# Patient Record
Sex: MALE
[De-identification: names, ages, dates, MRNs, and addresses within clinical notes are randomized per-mention and may not be internally consistent; named-entity substitution may affect disease eponyms.]

---

## 2022-04-14 ENCOUNTER — APPOINTMENT (OUTPATIENT)
Dept: PULMONOLOGY | Facility: CLINIC | Age: 67
End: 2022-04-14
Payer: MEDICARE

## 2022-04-14 VITALS
TEMPERATURE: 98.4 F | WEIGHT: 163 LBS | HEART RATE: 67 BPM | HEIGHT: 65 IN | RESPIRATION RATE: 13 BRPM | DIASTOLIC BLOOD PRESSURE: 88 MMHG | SYSTOLIC BLOOD PRESSURE: 150 MMHG | BODY MASS INDEX: 27.16 KG/M2 | OXYGEN SATURATION: 95 %

## 2022-04-14 DIAGNOSIS — E78.5 HYPERLIPIDEMIA, UNSPECIFIED: ICD-10-CM

## 2022-04-14 DIAGNOSIS — Z86.73 PERSONAL HISTORY OF TRANSIENT ISCHEMIC ATTACK (TIA), AND CEREBRAL INFARCTION W/OUT RESIDUAL DEFICITS: ICD-10-CM

## 2022-04-14 DIAGNOSIS — Z86.39 PERSONAL HISTORY OF OTHER ENDOCRINE, NUTRITIONAL AND METABOLIC DISEASE: ICD-10-CM

## 2022-04-14 DIAGNOSIS — Z87.438 PERSONAL HISTORY OF OTHER DISEASES OF MALE GENITAL ORGANS: ICD-10-CM

## 2022-04-14 DIAGNOSIS — R06.02 SHORTNESS OF BREATH: ICD-10-CM

## 2022-04-14 DIAGNOSIS — G81.91 HEMIPLEGIA, UNSPECIFIED AFFECTING RIGHT DOMINANT SIDE: ICD-10-CM

## 2022-04-14 PROBLEM — Z00.00 ENCOUNTER FOR PREVENTIVE HEALTH EXAMINATION: Status: ACTIVE | Noted: 2022-04-14

## 2022-04-14 PROCEDURE — 99205 OFFICE O/P NEW HI 60 MIN: CPT | Mod: 25

## 2022-04-14 PROCEDURE — 94729 DIFFUSING CAPACITY: CPT

## 2022-04-14 PROCEDURE — 94727 GAS DIL/WSHOT DETER LNG VOL: CPT

## 2022-04-14 PROCEDURE — 94060 EVALUATION OF WHEEZING: CPT

## 2022-04-14 RX ORDER — OMEPRAZOLE 40 MG/1
CAPSULE, DELAYED RELEASE ORAL
Refills: 0 | Status: ACTIVE | COMMUNITY

## 2022-04-14 RX ORDER — AMLODIPINE BESYLATE 5 MG/1
TABLET ORAL
Refills: 0 | Status: ACTIVE | COMMUNITY

## 2022-04-14 RX ORDER — EMPAGLIFLOZIN 25 MG/1
25 TABLET, FILM COATED ORAL
Refills: 0 | Status: ACTIVE | COMMUNITY

## 2022-04-14 RX ORDER — FINASTERIDE 5 MG/1
5 TABLET, FILM COATED ORAL
Refills: 0 | Status: ACTIVE | COMMUNITY

## 2022-04-14 RX ORDER — CLOPIDOGREL BISULFATE 75 MG/1
75 TABLET, FILM COATED ORAL
Refills: 0 | Status: ACTIVE | COMMUNITY

## 2022-04-14 RX ORDER — ATORVASTATIN CALCIUM 80 MG/1
TABLET, FILM COATED ORAL
Refills: 0 | Status: ACTIVE | COMMUNITY

## 2022-04-14 RX ORDER — METFORMIN HYDROCHLORIDE 625 MG/1
TABLET ORAL
Refills: 0 | Status: ACTIVE | COMMUNITY

## 2022-04-14 NOTE — HISTORY OF PRESENT ILLNESS
[Never] : never [TextBox_4] : He is a 66 year old man with a history of hyperlipidemia, diabetes, BPH and a stroke. He has a right hemiparesis and walks with a walker. No prior history of pulmonary disease. He never smoked. The occupational history was unremarkable. \par \par Complains of shortness of breath at rest and on exertion. No complaint of cough or wheezing. No fever, chills or sweats. No sick contacts.  [Difficulty Initiating Sleep] : does not have difficulty initiating sleep [Difficulty Maintaining Sleep] : does not have difficulty maintaining sleep

## 2022-04-14 NOTE — REVIEW OF SYSTEMS
[GERD] : gerd [Diabetes] : diabetes [Fever] : no fever [Nasal Congestion] : no nasal congestion [Dyspnea] : no dyspnea [Chest Discomfort] : no chest discomfort [Nasal Discharge] : no nasal discharge [Myalgias] : no myalgias [Rash] : no rash [History of Iron Deficiency] : no history of iron deficiency [Headache] : no headache [Anxiety] : no anxiety [Thyroid Problem] : no thyroid problem

## 2022-04-14 NOTE — PHYSICAL EXAM
[No Acute Distress] : no acute distress [No Neck Mass] : no neck mass [Normal S1, S2] : normal s1, s2 [Clear to Auscultation Bilaterally] : clear to auscultation bilaterally [No HSM] : no hsm [No Cyanosis] : no cyanosis [No Edema] : no edema [Normal Turgor] : normal turgor [Oriented x3] : oriented x3 [TextBox_132] : Right hemiparesis

## 2022-04-14 NOTE — DISCUSSION/SUMMARY
[FreeTextEntry1] : He is a 66 year old man with a history of hypertension, hyperlipidemia, diabetes, BPH and a stroke. He has a right hemiparesis and walks with a walker. No prior history of pulmonary disease. He never smoked. The occupational history was unremarkable. \par \par Complains of shortness of breath at rest and on exertion. No complaint of cough or wheezing. No fever, chills or sweats. No sick contacts. \par \par At present no evident pulmonary pathology. \par \par The physical examination was unremarkable. PFT is without obstruction. Lung volume reduction may be due to body habitus. A chest x-ray was requested. \par \par Further recommendations to follow the results of the above. \par

## 2022-04-14 NOTE — PROCEDURE
[FreeTextEntry1] : PFT 4/14/22: No obstruction. Moderate restriction. Diffusion capacity was normal. No significant improvement after bronchodilator. \par

## 2022-05-20 ENCOUNTER — APPOINTMENT (OUTPATIENT)
Dept: PULMONOLOGY | Facility: CLINIC | Age: 67
End: 2022-05-20

## 2022-06-24 ENCOUNTER — APPOINTMENT (OUTPATIENT)
Dept: PULMONOLOGY | Facility: CLINIC | Age: 67
End: 2022-06-24